# Patient Record
Sex: MALE | Race: WHITE | NOT HISPANIC OR LATINO | URBAN - METROPOLITAN AREA
[De-identification: names, ages, dates, MRNs, and addresses within clinical notes are randomized per-mention and may not be internally consistent; named-entity substitution may affect disease eponyms.]

---

## 2023-10-10 ENCOUNTER — OFFICE (OUTPATIENT)
Dept: URBAN - METROPOLITAN AREA CLINIC 84 | Facility: CLINIC | Age: 23
End: 2023-10-10

## 2023-10-10 VITALS
WEIGHT: 202 LBS | SYSTOLIC BLOOD PRESSURE: 120 MMHG | HEIGHT: 67 IN | OXYGEN SATURATION: 98 % | HEART RATE: 82 BPM | DIASTOLIC BLOOD PRESSURE: 82 MMHG

## 2023-10-10 DIAGNOSIS — K59.00 CONSTIPATION, UNSPECIFIED: ICD-10-CM

## 2023-10-10 PROCEDURE — 99203 OFFICE O/P NEW LOW 30 MIN: CPT | Performed by: INTERNAL MEDICINE

## 2023-10-10 RX ORDER — MINERAL OIL 100 G/100ML
ENEMA RECTAL
Qty: 1 | Refills: 3 | Status: ACTIVE
Start: 2023-10-10

## 2023-10-10 RX ORDER — POLYETHYLENE GLYCOL-3350 AND ELECTROLYTES WITH FLAVOR PACK 240; 5.84; 2.98; 6.72; 22.72 G/278.26G; G/278.26G; G/278.26G; G/278.26G; G/278.26G
POWDER, FOR SOLUTION ORAL
Qty: 1 | Refills: 0 | Status: ACTIVE
Start: 2023-10-10

## 2023-10-10 RX ORDER — LINACLOTIDE 290 UG/1
CAPSULE, GELATIN COATED ORAL
Qty: 90 | Refills: 2 | Status: ACTIVE
Start: 2023-10-10

## 2023-10-10 NOTE — SERVICENOTES
Step 1) perform Fleet Mineral Oil enema as instructed on packet.
Step 2) 1 Hour after the enema, proceed with bowel purge with Gavilyte as instructed.
Step 3) The day following the bowel purge, start Linzess as instructed and take 1 capful of miralax twice a day.

## 2023-10-10 NOTE — SERVICEHPINOTES
Lance Bernardo   is seen for an initial visit today.     
br   22 yo M w chronic constipation history establishing care for concerns about impending stool impaction. Has a long history of constipation since 2-3 years of age. Does not quantify how many spontaneous bowel movements he has but does have frequent loose, incomplete stools and occasional FI. Some history provided by his mother today suggests likely some non-adherence to BID miralax recommendations.
br
Had what sounds like a colonoscopic disimpaction 10 years ago in [facility]. No prior abdominal surgeries.